# Patient Record
(demographics unavailable — no encounter records)

---

## 2024-12-07 NOTE — HISTORY OF PRESENT ILLNESS
[de-identified] : CC: hoarseness    HISTORY OF PRESENT ILLNESS: PILAR PRASAD is a 60 year male who presents today with hoarseness x 6 mo he has noticed that over the past 4-6 mo his voice has become more raspy/hoarse. 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 he also accidentally ingested lye and was exposed to a burning town for a few days he gargles with saltwater which mildly helps denies dysphagia, dyspnea, classic GERD symptoms, smoking  otherwise healthy     REVIEW OF SYSTEMS: General ROS: negative for - chills, fatigue or fever Psychological ROS: negative for - anxiety or depression Ophthalmic ROS: negative for - blurry vision, decreased vision or double vision ENT ROS: negative except as noted from HPI Allergy and Immunology ROS: negative except as noted from HPI Hematological and Lymphatic ROS: negative for - bleeding problems Endocrine ROS: negative for - malaise/lethargy Respiratory ROS: negative for - stridor Cardiovascular ROS: negative for - chest pain Gastrointestinal ROS: negative for - appetite loss or nausea/vomiting Genitourinary ROS: negative for - incontinence Musculoskeletal ROS: negative for - gait disturbance Neurological ROS: negative for - behavioral changes Dermatological ROS: negative for - nail changes   Physical Exam:   GENERAL APPEARANCE: Well-developed and No Acute Distress. COMMUNICATION: Able to Communicate. Strong Voice.   HEAD AND FACE Eyes: Testing of ocular motility including primary gaze alignment normal. Inspection and Appearance: No evidence of lesions or masses Palpation: Palpation of the face reveals no sinus tenderness Salivary Glands: Symmetric without masses Facial Strength: Symmetric without evidence of facial paralysis   EAR, NOSE, MOUTH, and THROAT: Ear Canals and Tympanic Membranes, Bilateral: No evidence of inflammation or lesions. Thresholds: Clinical speech reception thresholds normal. External, Nose and Auricle: No lesions or masses. Nasal, Mucosa, Septum, and Turbinates: See endoscopy.   NECK: Evaluation: No evidence of masses or crepitus. The neck is symmetric and the trachea is in the midline position. Thyroid: No evidence of enlargement, tenderness or mass. Neck Lymph Nodes: WNL. Respiratory: Inspection of the chest including symmetry, expansion and/or assessment of respiratory effort normal. Cardiovascular: Evaluation of peripheral vascular system by observation and palpation of capillary refill, normal. Neurological/Psychiatric: Alert, Oriented, Mood, and Affect Normal.   PROCEDURE: Flexible laryngoscopy with topical anesthesia (56112) ANESTHESIA: Topical with 4% Lidocaine   INDICATION FOR PROCEDURE: Unable to perform complete exam with mirror laryngoscopy   PREOPERATIVE DIAGNOSIS:    POSTOPERATIVE DIAGNOSIS: same as above   SURGEON: Klever Farley MD   Prior to the procedure, I had a discussion with the patient regarding the risks, benefits, and alternatives of the procedure and a verbal consent was obtained.   INSTRUMENTS: Flexible scope   OPERATIVE PROCEDURE NOTE:   Patient was taken to the endoscopy suite where the nose and the pharynx were anesthetized with topical Pontocaine in a spray form. After adequate anesthesia of the nasal cavity and the pharynx, the fiberoptic endoscope was inserted through the nasal cavity. The palate was identified for patency. The nasopharynx, oropharynx, hypopharynx and the larynx were examined, along with the base of tongue. Structural examination of vocal cord movement was carried out and the larynx was examined during phonation and deglutition. Gestures, such as cough, laughing and respiration were also performed to look for laryngeal abnormalities.   EXAM FINDINGS:    The nasal cavity mucosa was pink and normal. No nasal cavity lesions or masses were observed. No signs of infection or pus.   The nasopharynx was clear. No mass or lesion visualized.   The tongue was surface was found to be normal appearing. No lesions or masses seen in the pharyngeal, hypopharyngeal, or laryngeal airway. Mucosa appeared mildly erythematous over the arytenoids. no ulcers seen. thick mucus over the true cords. The vocal cords were mobile bilaterally. Airway intact.  IMPRESSION: PILAR PRASAD is a 60 year male who presents today dysphonia, LPR, accidentally inhalation of lye fumes   PLAN:  - cipro for 10 days - omeprazole 40mg, prescribed - low acid diet - RTC in 6 weeks       Klever Farley MD [facility] Rhinology and Skull Base Surgery Department of Otolaryngology- Head and Neck Surgery [facility]

## 2024-12-07 NOTE — HISTORY OF PRESENT ILLNESS
[de-identified] : CC: hoarseness    HISTORY OF PRESENT ILLNESS: PILAR PRASAD is a 60 year male who presents today with hoarseness x 6 mo he has noticed that over the past 4-6 mo his voice has become more raspy/hoarse. 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 he also accidentally ingested lye and was exposed to a [place] town for a few days he gargles with saltwater which mildly helps denies dysphagia, dyspnea, classic GERD symptoms, smoking  otherwise healthy     REVIEW OF SYSTEMS: General ROS: negative for - chills, fatigue or fever Psychological ROS: negative for - anxiety or depression Ophthalmic ROS: negative for - blurry vision, decreased vision or double vision ENT ROS: negative except as noted from HPI Allergy and Immunology ROS: negative except as noted from HPI Hematological and Lymphatic ROS: negative for - bleeding problems Endocrine ROS: negative for - malaise/lethargy Respiratory ROS: negative for - stridor Cardiovascular ROS: negative for - chest pain Gastrointestinal ROS: negative for - appetite loss or nausea/vomiting Genitourinary ROS: negative for - incontinence Musculoskeletal ROS: negative for - gait disturbance Neurological ROS: negative for - behavioral changes Dermatological ROS: negative for - nail changes   Physical Exam:   GENERAL APPEARANCE: Well-developed and No Acute Distress. COMMUNICATION: Able to Communicate. Strong Voice.   HEAD AND FACE Eyes: Testing of ocular motility including primary gaze alignment normal. Inspection and Appearance: No evidence of lesions or masses Palpation: Palpation of the face reveals no sinus tenderness Salivary Glands: Symmetric without masses Facial Strength: Symmetric without evidence of facial paralysis   EAR, NOSE, MOUTH, and THROAT: Ear Canals and Tympanic Membranes, Bilateral: No evidence of inflammation or lesions. Thresholds: Clinical speech reception thresholds normal. External, Nose and Auricle: No lesions or masses. Nasal, Mucosa, Septum, and Turbinates: See endoscopy.   NECK: Evaluation: No evidence of masses or crepitus. The neck is symmetric and the trachea is in the midline position. Thyroid: No evidence of enlargement, tenderness or mass. Neck Lymph Nodes: WNL. Respiratory: Inspection of the chest including symmetry, expansion and/or assessment of respiratory effort normal. Cardiovascular: Evaluation of peripheral vascular system by observation and palpation of capillary refill, normal. Neurological/Psychiatric: Alert, Oriented, Mood, and Affect Normal.   PROCEDURE: Flexible laryngoscopy with topical anesthesia (31794) ANESTHESIA: Topical with 4% Lidocaine   INDICATION FOR PROCEDURE: Unable to perform complete exam with mirror laryngoscopy   PREOPERATIVE DIAGNOSIS:    POSTOPERATIVE DIAGNOSIS: same as above   SURGEON: Klever Farley MD   Prior to the procedure, I had a discussion with the patient regarding the risks, benefits, and alternatives of the procedure and a verbal consent was obtained.   INSTRUMENTS: Flexible scope   OPERATIVE PROCEDURE NOTE:   Patient was taken to the endoscopy suite where the nose and the pharynx were anesthetized with topical Pontocaine in a spray form. After adequate anesthesia of the nasal cavity and the pharynx, the fiberoptic endoscope was inserted through the nasal cavity. The palate was identified for patency. The nasopharynx, oropharynx, hypopharynx and the larynx were examined, along with the base of tongue. Structural examination of vocal cord movement was carried out and the larynx was examined during phonation and deglutition. Gestures, such as cough, laughing and respiration were also performed to look for laryngeal abnormalities.   EXAM FINDINGS:    The nasal cavity mucosa was pink and normal. No nasal cavity lesions or masses were observed. No signs of infection or pus.   The nasopharynx was clear. No mass or lesion visualized.   The tongue was surface was found to be normal appearing. No lesions or masses seen in the pharyngeal, hypopharyngeal, or laryngeal airway. Mucosa appeared mildly erythematous over the arytenoids. no ulcers seen. thick mucus over the true cords. The vocal cords were mobile bilaterally. Airway intact.  IMPRESSION: PILAR PRASAD is a 60 year male who presents today dysphonia, LPR, accidentally inhalation of lye fumes   PLAN:  - cipro for 10 days - omeprazole 40mg, prescribed - low acid diet - RTC in 6 weeks       Klever Farley MD [facility] Rhinology and Skull Base Surgery Department of Otolaryngology- Head and Neck Surgery [facility]

## 2025-01-02 NOTE — ASSESSMENT
[FreeTextEntry1] : - 1/2/2025 60-year-old gentleman who presents with 4 months of dysphonia.  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  I reviewed this at length with the patient including the erythema and stippling nature.  There is also evidence of some leukoplakia on the right anterior vocal fold as well.  At this time I am recommending going to the operating room for direct microlaryngoscopy with biopsy and KTP laser epilation.  In the interim I am recommending voice hygiene and observation.  Patient have a preoperative visit and if symptoms are improving and physical exam findings are improving we will cancel the surgery.  Risk, benefits and alternatives of surgery were discussed including bleeding, infection, pain, risk of anesthesia, problems chewing or swallowing, changed or worsened voice, chipped teeth, tongue numbness, taste disturbance, referred ear pain, need for further procedures and possible time off of work.  Patient understands this and wishes to proceed.  If we do proceed with surgery and symptoms are persisting we will also plan for CT neck at that time.  -Plan for OR, suspension [employer] laryngoscopy with excision of vocal fold lesion and KTP laser epilation in 4 to 6 weeks. -For preoperative visit we will reassess and cancel surgery if improvement -If no improvement will also plan for CT neck

## 2025-01-02 NOTE — PROCEDURE
[de-identified] :  - Procedure Note   Pre-operative Diagnosis: Dysphonia Post-operative Diagnosis: Left vocal fold lesion leukoplakic with stippling and erythema, small amount of leukoplakia right anterior vocal fold as well Anesthesia: Topical - 1 % Lidocaine/Phenylephrine Procedure:  Flexible Laryngoscopy with Stroboscopy - [facility] [address]   Procedure Details:  The patient was placed in the sitting position.  After decongestant and anesthesia were applied the laryngoscope was passed.  The nasal cavities, nasopharynx, oropharynx, hypopharynx, and larynx were all examined.  Vocal folds were examined during respiration and phonation.  The following findings were noted:   Findings:  Nose: Septum is midline, turbinates are normal, nasal airways patent, mucosa normal Nasopharynx: Adenoids normal, no masses, eustachian tube normal Oropharynx: Pharyngeal walls symmetric and without lesion. Tonsils/fossae symmetric Hypopharynx: Hypopharynx and pyriform sinuses without lesion. No masses or asymmetry.  No pooling of secretions. Larynx:  Epiglottis and aryepiglottic folds were sharp and crisp bilaterally.  Bilateral false vocal folds normal appearance. Airway was widely patent.   Strobe Exam Ratings    TVF Appearance: Left vocal fold lesion leukoplakic with stippling and erythema, small amount of leukoplakia right anterior vocal fold as well TVF Mobility: normal Edema/hypertrophy: normal Mucus on TVF: normal Glottic Closure: + Gap Mucosal Wave: Reduced Amplitude of Vibration: Reduced Phase: symmetric Supraglottic Hyperfunction: none Other Findings: none   Condition: Stable.  Patient tolerated procedure well.   Complications: None   --------------------------------------------------------------------   Procedure: Pharyngeal and speech evaluation, by cine or video - CPT 19069  Voice assessment was recorded via video recording on this date.   Clarity: Hoarse and raspy Range: Reduced Pitch Control: Reduced Projection: normal Tremor: none Cough: none   Condition: Stable.  Patient tolerated procedure well. Complications: None

## 2025-01-02 NOTE — HISTORY OF PRESENT ILLNESS
[de-identified] : CC: hoarseness    HISTORY OF PRESENT ILLNESS: PILAR PRASAD is a 60 year male who presents today with hoarseness x 6 mo he has noticed that over the past 4-6 mo his voice has become more raspy/hoarse. 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 he also accidentally ingested lye and was exposed to a [place] for a [place] days he gargles with saltwater which mildly helps denies dysphagia, dyspnea, classic GERD symptoms, smoking  otherwise healthy   1 mo f/u s/p cipro, omeprazole voice is better but he still has significant dysphonia   REVIEW OF SYSTEMS: General ROS: negative for - chills, fatigue or fever Psychological ROS: negative for - anxiety or depression Ophthalmic ROS: negative for - blurry vision, decreased vision or double vision ENT ROS: negative except as noted from HPI Allergy and Immunology ROS: negative except as noted from HPI Hematological and Lymphatic ROS: negative for - bleeding problems Endocrine ROS: negative for - malaise/lethargy Respiratory ROS: negative for - stridor Cardiovascular ROS: negative for - chest pain Gastrointestinal ROS: negative for - appetite loss or nausea/vomiting Genitourinary ROS: negative for - incontinence Musculoskeletal ROS: negative for - gait disturbance Neurological ROS: negative for - behavioral changes Dermatological ROS: negative for - nail changes   Physical Exam:   GENERAL APPEARANCE: Well-developed and No Acute Distress. COMMUNICATION: Able to Communicate. Strong Voice.   HEAD AND FACE Eyes: Testing of ocular motility including primary gaze alignment normal. Inspection and Appearance: No evidence of lesions or masses Palpation: Palpation of the face reveals no sinus tenderness Salivary Glands: Symmetric without masses Facial Strength: Symmetric without evidence of facial paralysis   EAR, NOSE, MOUTH, and THROAT: Ear Canals and Tympanic Membranes, Bilateral: No evidence of inflammation or lesions. Thresholds: Clinical speech reception thresholds normal. External, Nose and Auricle: No lesions or masses. Nasal, Mucosa, Septum, and Turbinates: See endoscopy.   NECK: Evaluation: No evidence of masses or crepitus. The neck is symmetric and the trachea is in the midline position. Thyroid: No evidence of enlargement, tenderness or mass. Neck Lymph Nodes: WNL. Respiratory: Inspection of the chest including symmetry, expansion and/or assessment of respiratory effort normal. Cardiovascular: Evaluation of peripheral vascular system by observation and palpation of capillary refill, normal. Neurological/Psychiatric: Alert, Oriented, Mood, and Affect Normal.   PROCEDURE: Flexible laryngoscopy with topical anesthesia (46995) ANESTHESIA: Topical with 4% Lidocaine   INDICATION FOR PROCEDURE: Unable to perform complete exam with mirror laryngoscopy   PREOPERATIVE DIAGNOSIS:    POSTOPERATIVE DIAGNOSIS: same as above   SURGEON: Klever Farley MD   Prior to the procedure, I had a discussion with the patient regarding the risks, benefits, and alternatives of the procedure and a verbal consent was obtained.   INSTRUMENTS: Flexible scope   OPERATIVE PROCEDURE NOTE:   Patient was taken to the endoscopy suite where the nose and the pharynx were anesthetized with topical Pontocaine in a spray form. After adequate anesthesia of the nasal cavity and the pharynx, the fiberoptic endoscope was inserted through the nasal cavity. The palate was identified for patency. The nasopharynx, oropharynx, hypopharynx and the larynx were examined, along with the base of tongue. Structural examination of vocal cord movement was carried out and the larynx was examined during phonation and deglutition. Gestures, such as cough, laughing and respiration were also performed to look for laryngeal abnormalities.   EXAM FINDINGS:    The nasal cavity mucosa was pink and normal. No nasal cavity lesions or masses were observed. No signs of infection or pus.   The nasopharynx was clear. No mass or lesion visualized.   The tongue was surface was found to be normal appearing. No lesions or masses seen in the pharyngeal, hypopharyngeal, or laryngeal airway. left TVF mobile but there is erythema and a white plaque most consistent with leukoplakia. Airway intact.  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 Tillman, who will see patient for possible DL biopsy -patient is in agreement of management    Klever Farley MD [facility] Rhinology and Skull Base Surgery Department of Otolaryngology- Head and Neck Surgery [facility]

## 2025-01-02 NOTE — HISTORY OF PRESENT ILLNESS
[de-identified] : CC: hoarseness    HISTORY OF PRESENT ILLNESS: PIALR PRASAD is a 60 year male who presents today with hoarseness x 6 mo he has noticed that over the past 4-6 mo his voice has become more raspy/hoarse. 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 he also accidentally ingested lye and was exposed to a [place] town for a [place] days he gargles with saltwater which mildly helps denies dysphagia, dyspnea, classic GERD symptoms, smoking  otherwise healthy   1 mo f/u s/p cipro, omeprazole voice is better but he still has significant dysphonia   REVIEW OF SYSTEMS: General ROS: negative for - chills, fatigue or fever Psychological ROS: negative for - anxiety or depression Ophthalmic ROS: negative for - blurry vision, decreased vision or double vision ENT ROS: negative except as noted from HPI Allergy and Immunology ROS: negative except as noted from HPI Hematological and Lymphatic ROS: negative for - bleeding problems Endocrine ROS: negative for - malaise/lethargy Respiratory ROS: negative for - stridor Cardiovascular ROS: negative for - chest pain Gastrointestinal ROS: negative for - appetite loss or nausea/vomiting Genitourinary ROS: negative for - incontinence Musculoskeletal ROS: negative for - gait disturbance Neurological ROS: negative for - behavioral changes Dermatological ROS: negative for - nail changes   Physical Exam:   GENERAL APPEARANCE: Well-developed and No Acute Distress. COMMUNICATION: Able to Communicate. Strong Voice.   HEAD AND FACE Eyes: Testing of ocular motility including primary gaze alignment normal. Inspection and Appearance: No evidence of lesions or masses Palpation: Palpation of the face reveals no sinus tenderness Salivary Glands: Symmetric without masses Facial Strength: Symmetric without evidence of facial paralysis   EAR, NOSE, MOUTH, and THROAT: Ear Canals and Tympanic Membranes, Bilateral: No evidence of inflammation or lesions. Thresholds: Clinical speech reception thresholds normal. External, Nose and Auricle: No lesions or masses. Nasal, Mucosa, Septum, and Turbinates: See endoscopy.   NECK: Evaluation: No evidence of masses or crepitus. The neck is symmetric and the trachea is in the midline position. Thyroid: No evidence of enlargement, tenderness or mass. Neck Lymph Nodes: WNL. Respiratory: Inspection of the chest including symmetry, expansion and/or assessment of respiratory effort normal. Cardiovascular: Evaluation of peripheral vascular system by observation and palpation of capillary refill, normal. Neurological/Psychiatric: Alert, Oriented, Mood, and Affect Normal.   PROCEDURE: Flexible laryngoscopy with topical anesthesia (50842) ANESTHESIA: Topical with 4% Lidocaine   INDICATION FOR PROCEDURE: Unable to perform complete exam with mirror laryngoscopy   PREOPERATIVE DIAGNOSIS:    POSTOPERATIVE DIAGNOSIS: same as above   SURGEON: Klever Farley MD   Prior to the procedure, I had a discussion with the patient regarding the risks, benefits, and alternatives of the procedure and a verbal consent was obtained.   INSTRUMENTS: Flexible scope   OPERATIVE PROCEDURE NOTE:   Patient was taken to the endoscopy suite where the nose and the pharynx were anesthetized with topical Pontocaine in a spray form. After adequate anesthesia of the nasal cavity and the pharynx, the fiberoptic endoscope was inserted through the nasal cavity. The palate was identified for patency. The nasopharynx, oropharynx, hypopharynx and the larynx were examined, along with the base of tongue. Structural examination of vocal cord movement was carried out and the larynx was examined during phonation and deglutition. Gestures, such as cough, laughing and respiration were also performed to look for laryngeal abnormalities.   EXAM FINDINGS:    The nasal cavity mucosa was pink and normal. No nasal cavity lesions or masses were observed. No signs of infection or pus.   The nasopharynx was clear. No mass or lesion visualized.   The tongue was surface was found to be normal appearing. No lesions or masses seen in the pharyngeal, hypopharyngeal, or laryngeal airway. left TVF mobile but there is erythema and a white plaque most consistent with leukoplakia. Airway intact.  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 Tillman, who will see patient for possible DL biopsy -patient is in agreement of management    Klever Farley MD [facility] Rhinology and Skull Base Surgery Department of Otolaryngology- Head and Neck Surgery [facility]

## 2025-01-02 NOTE — REASON FOR VISIT
[Initial Consultation] : an initial consultation for [FreeTextEntry2] : Dysphonia, vocal fold lesion

## 2025-01-02 NOTE — HISTORY OF PRESENT ILLNESS
[de-identified] : - 1/2/2025 60-year-old gentleman who presents with concern for dysphonia and left vocal fold lesion.  Patient notes that symptoms began over 4 months ago.  At that time notes that he was exposed to "lye."  Since that time notes a hoarseness and rough quality to his voice.  This is fairly constant.  No issues chewing eating or swallowing.  No breathing issues.  After 3 months of symptoms seen by Dr. Farley and noted to have a left vocal fold leukoplakia.  Treated with voice hygiene, oral ciprofloxacin, and omeprazole.  After a month of treatment the patient noted some improvement in terms of voice.  That being said there remained persistent leukoplakia.  Patient referred to me for further evaluation.  Patient with moderate voice demands, works in [employer].

## 2025-01-31 NOTE — ASSESSMENT
[FreeTextEntry1] : - 1/2/2025 60-year-old gentleman who presents with 4 months of dysphonia.  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  I reviewed this at length with the patient including the erythema and stippling nature.  There is also evidence of some leukoplakia on the right anterior vocal fold as well.  At this time I am recommending going to the operating room for direct microlaryngoscopy with biopsy and KTP laser ablation.  In the interim I am recommending voice hygiene and observation.  Patient have a preoperative visit and if symptoms are improving and physical exam findings are improving we will cancel the surgery.  Risk, benefits and alternatives of surgery were discussed including bleeding, infection, pain, risk of anesthesia, problems chewing or swallowing, changed or worsened voice, chipped teeth, tongue numbness, taste disturbance, referred ear pain, need for further procedures and possible time off of work.  Patient understands this and wishes to proceed.  If we do proceed with surgery and symptoms are persisting we will also plan for CT neck at that time.  1/31/25: He has been treated with oral Ciprofloxacin, Fluconazole, and Omeprazole and notes his voice is 50% improved. On physical exam he was found to have persistent left vocal fold lesion leukoplakic with stippling and erythema, small amount of leukoplakia right anterior vocal fold. At this point I am recommending we proceed with going to the OR for suspension MicroDirect laryngoscopy with excision of vocal fold lesion and KTP laser ablation. Risks, benefits, and alternatives were again discussed. Postop antireflux medication was renewed and we reviewed postop voice rest instructions. Based on final pathology consider CT neck postoperatively.   -Plan for OR, suspension MicroDirect laryngoscopy with excision of vocal fold lesion and KTP laser ablation -Consider postop CT based on final pathology results

## 2025-01-31 NOTE — REASON FOR VISIT
[Subsequent Evaluation] : a subsequent evaluation for [FreeTextEntry2] : dysphonia, vocal fold lesion

## 2025-01-31 NOTE — PROCEDURE
[de-identified] :  - Procedure Note   Pre-operative Diagnosis: Dysphonia Post-operative Diagnosis: Left vocal fold lesion leukoplakic with stippling and erythema, small amount of leukoplakia right anterior vocal fold as well Anesthesia: Topical - 1 % Lidocaine/Phenylephrine Procedure:  Flexible Laryngoscopy with Stroboscopy - [facility] [id number]   Procedure Details:  The patient was placed in the sitting position.  After decongestant and anesthesia were applied the laryngoscope was passed.  The nasal cavities, nasopharynx, oropharynx, hypopharynx, and larynx were all examined.  Vocal folds were examined during respiration and phonation.  The following findings were noted:   Findings:  Nose: Septum is midline, turbinates are normal, nasal airways patent, mucosa normal Nasopharynx: Adenoids normal, no masses, eustachian tube normal Oropharynx: Pharyngeal walls symmetric and without lesion. Tonsils/fossae symmetric Hypopharynx: Hypopharynx and pyriform sinuses without lesion. No masses or asymmetry.  No pooling of secretions. Larynx:  Epiglottis and aryepiglottic folds were sharp and crisp bilaterally.  Bilateral false vocal folds normal appearance. Airway was widely patent.   Strobe Exam Ratings    TVF Appearance: Left vocal fold lesion leukoplakic with stippling and erythema, small amount of leukoplakia right anterior vocal fold as well TVF Mobility: normal Edema/hypertrophy: normal Mucus on TVF: normal Glottic Closure: + Gap Mucosal Wave: Reduced Amplitude of Vibration: Reduced Phase: symmetric Supraglottic Hyperfunction: none Other Findings: none   Condition: Stable.  Patient tolerated procedure well.   Complications: None   --------------------------------------------------------------------   Procedure: Pharyngeal and speech evaluation, by cine or video - CPT 32390  Voice assessment was recorded via video recording on this date.   Clarity: Hoarse and raspy Range: Reduced Pitch Control: Reduced Projection: normal Tremor: none Cough: none   Condition: Stable.  Patient tolerated procedure well. Complications: None

## 2025-01-31 NOTE — HISTORY OF PRESENT ILLNESS
[de-identified] : - 1/2/2025 60-year-old gentleman who presents with concern for dysphonia and left vocal fold lesion.  Patient notes that symptoms began over 4 months ago.  At that time notes that he was exposed to "lye."  Since that time notes a hoarseness and rough quality to his voice.  This is fairly constant.  No issues chewing eating or swallowing.  No breathing issues.  After 3 months of symptoms seen by Dr. Farley and noted to have a left vocal fold leukoplakia.  Treated with voice hygiene, oral ciprofloxacin, and omeprazole.  After a month of treatment the patient noted some improvement in terms of voice.  That being said there remained persistent leukoplakia.  Patient referred to me for further evaluation.  Patient with moderate voice demands, works in [employer].   - [FreeTextEntry1] : 1/31/25 Patient presents for repeat evaluation. He feels voice is 50% improved. In terms of treatments he has previously taken Ciprofloxacin, Fluconazole for 3 weeks, finished Omeprazole 5 days ago. He is tentatively scheduled for OR, suspension MicroDirect laryngoscopy with excision of vocal fold lesion and KTP laser ablation 2/5 pending this evaluation. He does not use inhalers. No history of diabetes. No new ENT issues otherwise.

## 2025-02-12 NOTE — DATA REVIEWED
[de-identified] : Accession:  [id number] Collected Date/Time: 2/5/2025 14:55 EST Received Date/Time:                    2/5/2025 17:44 EST  Surgical Pathology Report - Auth (Verified)  Specimen(s) Submitted 1. Left vocal fold lesion 2. Right vocal fold lesion  Final Diagnosis  1. 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 Right vocal fold lesion, biopsy: - Fragments of squamous epithelium with focal high-grade dysplasia - Multiple serial sections examined - Selected slides were reviewed intradepartmentally with another pathologist (MEGHANA) Verified by: Darell Rodriguez MD
DISPLAY PLAN FREE TEXT

## 2025-02-12 NOTE — HISTORY OF PRESENT ILLNESS
[de-identified] : - 1/2/2025 60-year-old gentleman who presents with concern for dysphonia and left vocal fold lesion.  Patient notes that symptoms began over 4 months ago.  At that time notes that he was exposed to "lye."  Since that time notes a hoarseness and rough quality to his voice.  This is fairly constant.  No issues chewing eating or swallowing.  No breathing issues.  After 3 months of symptoms seen by Dr. Farley and noted to have a left vocal fold leukoplakia.  Treated with voice hygiene, oral ciprofloxacin, and omeprazole.  After a month of treatment the patient noted some improvement in terms of voice.  That being said there remained persistent leukoplakia.  Patient referred to me for further evaluation.  Patient with moderate voice demands, works in [employer] and [employer].   - 1/31/25 Patient presents for repeat evaluation. He feels voice is 50% improved. In terms of treatments he has previously taken Ciprofloxacin, Fluconazole for 3 weeks, finished Omeprazole 5 days ago. He is tentatively scheduled for OR, suspension MicroDirect laryngoscopy with excision of vocal fold lesion and KTP laser ablation 2/5 pending this evaluation. He does not use inhalers. No history of diabetes. No new ENT issues otherwise. - [FreeTextEntry1] : 2/12/25 POD#7 s/p MDL excision of bilateral vocal fold lesions.  Voice is hoarse but improving. Doing well overall, no issues chewing, eating, or swallowing. No significant pain. No breathing issues.

## 2025-02-12 NOTE — ASSESSMENT
[FreeTextEntry1] : - 1/2/2025 60-year-old gentleman who presents with 4 months of dysphonia.  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  I reviewed this at length with the patient including the erythema and stippling nature.  There is also evidence of some leukoplakia on the right anterior vocal fold as well.  At this time I am recommending going to the operating room for direct microlaryngoscopy with biopsy and KTP laser ablation.  In the interim I am recommending voice hygiene and observation.  Patient have a preoperative visit and if symptoms are improving and physical exam findings are improving we will cancel the surgery.  Risk, benefits and alternatives of surgery were discussed including bleeding, infection, pain, risk of anesthesia, problems chewing or swallowing, changed or worsened voice, chipped teeth, tongue numbness, taste disturbance, referred ear pain, need for further procedures and possible time off of work.  Patient understands this and wishes to proceed.  If we do proceed with surgery and symptoms are persisting we will also plan for CT neck at that time.  1/31/25: He has been treated with oral Ciprofloxacin, Fluconazole, and Omeprazole and notes his voice is 50% improved. On physical exam he was found to have persistent left vocal fold lesion leukoplakic with stippling and erythema, small amount of leukoplakia right anterior vocal fold. At this point I am recommending we proceed with going to the OR for suspension MicroDirect laryngoscopy with excision of vocal fold lesion and KTP laser ablation. Risks, benefits, and alternatives were again discussed. Postop antireflux medication was renewed and we reviewed postop voice rest instructions. Based on final pathology consider CT neck postoperatively.   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  On exam patient overall doing well.  At this time I recommended continued voice hygiene and increased hydration.  I am recommending CT neck fine cuts through larynx as well as PET/CT.  Once we have these results we will plan for discussion at tumor board.  That being said we did have a lengthy discussion regarding next steps in treatment which could include observation versus radiation.  -Voice hygiene -CT neck, PET scan -Tumor board -Follow-up 1 month

## 2025-02-12 NOTE — PROCEDURE
[de-identified] :  - Procedure Note   Pre-operative Diagnosis: Dysphonia Post-operative Diagnosis: postoperative changes with well-healing vocal folds bilaterally Anesthesia: Topical - 1 % Lidocaine/Phenylephrine Procedure:  Flexible Laryngoscopy with Stroboscopy - [facility] [id number]   Procedure Details:  The patient was placed in the sitting position.  After decongestant and anesthesia were applied the laryngoscope was passed.  The nasal cavities, nasopharynx, oropharynx, hypopharynx, and larynx were all examined.  Vocal folds were examined during respiration and phonation.  The following findings were noted:   Findings:  Nose: Septum is midline, turbinates are normal, nasal airways patent, mucosa normal Nasopharynx: Adenoids normal, no masses, eustachian tube normal Oropharynx: Pharyngeal walls symmetric and without lesion. Tonsils/fossae symmetric Hypopharynx: Hypopharynx and pyriform sinuses without lesion. No masses or asymmetry.  No pooling of secretions. Larynx:  Epiglottis and aryepiglottic folds were sharp and crisp bilaterally.  Bilateral false vocal folds normal appearance. Airway was widely patent.   Strobe Exam Ratings    TVF Appearance:postoperative changes with well-healing vocal folds bilaterally TVF Mobility: normal Edema/hypertrophy: normal Mucus on TVF: normal Glottic Closure: + Gap Mucosal Wave: Reduced Amplitude of Vibration: Reduced Phase: symmetric Supraglottic Hyperfunction: none Other Findings: none   Condition: Stable.  Patient tolerated procedure well. Complications: None   --------------------------------------------------------------------   Procedure: Pharyngeal and speech evaluation, by cine or video - CPT 31649  Voice assessment was recorded via video recording on this date.   Clarity: Hoarse and raspy Range: Reduced Pitch Control: Reduced Projection: normal Tremor: none Cough: none   Condition: Stable.  Patient tolerated procedure well. Complications: None

## 2025-03-06 NOTE — END OF VISIT
[Time Spent: ___ minutes] : I have spent [unfilled] minutes of time on the encounter which excludes teaching and separately reported services.
prenatal chart

## 2025-03-06 NOTE — ASSESSMENT
[FreeTextEntry1] : - 1/2/2025 60-year-old gentleman who presents with 4 months of dysphonia.  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  I reviewed this at length with the patient including the erythema and stippling nature.  There is also evidence of some leukoplakia on the right anterior vocal fold as well.  At this time I am recommending going to the operating room for direct microlaryngoscopy with biopsy and KTP laser ablation.  In the interim I am recommending voice hygiene and observation.  Patient have a preoperative visit and if symptoms are improving and physical exam findings are improving we will cancel the surgery.  Risk, benefits and alternatives of surgery were discussed including bleeding, infection, pain, risk of anesthesia, problems chewing or swallowing, changed or worsened voice, chipped teeth, tongue numbness, taste disturbance, referred ear pain, need for further procedures and possible time off of work.  Patient understands this and wishes to proceed.  If we do proceed with surgery and symptoms are persisting we will also plan for CT neck at that time.  1/31/25: He has been treated with oral Ciprofloxacin, Fluconazole, and Omeprazole and notes his voice is 50% improved. On physical exam he was found to have persistent left vocal fold lesion leukoplakic with stippling and erythema, small amount of leukoplakia right anterior vocal fold. At this point I am recommending we proceed with going to the OR for suspension MicroDirect laryngoscopy with excision of vocal fold lesion and KTP laser ablation. Risks, benefits, and alternatives were again discussed. Postop antireflux medication was renewed and we reviewed postop voice rest instructions. Based on final pathology consider CT neck postoperatively.   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  On exam patient overall doing well.  At this time I recommended continued voice hygiene and increased hydration.  I am recommending CT neck fine cuts through larynx as well as PET/CT.  Once we have these results we will plan for discussion at tumor board.  That being said we did have a lengthy discussion regarding next steps in treatment which could include observation versus radiation.  3/6/2025 Overall stable.  Notes improvement in terms of voice.  Completed a CT and PET scan.  Both these were essentially unremarkable as to any malignancy or distant disease.  We also had our tumor board meeting with recommendations including observation versus XRT.  I reviewed the risk benefits and alternatives with the patient at length.  Patient will consider these options though for now leaning towards observation.  Will plan for next postop visit in 2 weeks.  -Reviewed tumor board recommendations will discuss again at next visit -Voice hygiene -Follow-up 2 wks

## 2025-03-06 NOTE — HISTORY OF PRESENT ILLNESS
[de-identified] : - 1/2/2025 60-year-old gentleman who presents with concern for dysphonia and left vocal fold lesion.  Patient notes that symptoms began over 4 months ago.  At that time notes that he was exposed to "lye."  Since that time notes a hoarseness and rough quality to his voice.  This is fairly constant.  No issues chewing eating or swallowing.  No breathing issues.  After 3 months of symptoms seen by Dr. Farley and noted to have a left vocal fold leukoplakia.  Treated with voice hygiene, oral ciprofloxacin, and omeprazole.  After a month of treatment the patient noted some improvement in terms of voice.  That being said there remained persistent leukoplakia.  Patient referred to me for further evaluation.  Patient with moderate voice demands, works in [email or web address] and [employer].   - 1/31/25 Patient presents for repeat evaluation. He feels voice is 50% improved. In terms of treatments he has previously taken Ciprofloxacin, Fluconazole for 3 weeks, finished Omeprazole 5 days ago. He is tentatively scheduled for OR, suspension MicroDirect laryngoscopy with excision of vocal fold lesion and KTP laser ablation 2/5 pending this evaluation. He does not use inhalers. No history of diabetes. No new ENT issues otherwise. - [FreeTextEntry1] : 2/12/25 POD#7 s/p MDL excision of bilateral vocal fold lesions.  Voice is hoarse but improving. Doing well overall, no issues chewing, eating, or swallowing. No significant pain. No breathing issues.  - 3/6/2025 Overall notes improvement in terms of voice.  Did complete CT and PET scan.  Presents to review those results.  Also we completed our tumor board with plan to review those recommendations as well. [TextEntry] : Visit initiated at patient request.  This visit is completed using audio/visual technology.  There are limitations of telemedicine encounters including risks associated with the technology platform and limited physical exam.  There is also limitation in performing diagnostic procedures and patients may need further testing and work up to arrive at a diagnosis and treatment plan. The patient below is located at home at that time of the visit.  I, Jono Tillman MD, the provider am located at my medical office.  Verbal consent for telehealth services for the date attached to this note was given by the patient.  This will be billed as a visit.  The patient understands this and elected to proceed.

## 2025-03-17 NOTE — HISTORY OF PRESENT ILLNESS
[de-identified] : - 1/2/2025 60-year-old gentleman who presents with concern for dysphonia and left vocal fold lesion.  Patient notes that symptoms began over 4 months ago.  At that time notes that he was exposed to "lye."  Since that time notes a hoarseness and rough quality to his voice.  This is fairly constant.  No issues chewing eating or swallowing.  No breathing issues.  After 3 months of symptoms seen by Dr. Farley and noted to have a left vocal fold leukoplakia.  Treated with voice hygiene, oral ciprofloxacin, and omeprazole.  After a month of treatment the patient noted some improvement in terms of voice.  That being said there remained persistent leukoplakia.  Patient referred to me for further evaluation.  Patient with moderate voice demands, works in [employer] and [employer].   - 1/31/25 Patient presents for repeat evaluation. He feels voice is 50% improved. In terms of treatments he has previously taken Ciprofloxacin, Fluconazole for 3 weeks, finished Omeprazole 5 days ago. He is tentatively scheduled for OR, suspension MicroDirect laryngoscopy with excision of vocal fold lesion and KTP laser ablation 2/5 pending this evaluation. He does not use inhalers. No history of diabetes. No new ENT issues otherwise. - 2/12/25 POD#7 s/p MDL excision of bilateral vocal fold lesions.  Voice is hoarse but improving. Doing well overall, no issues chewing, eating, or swallowing. No significant pain. No breathing issues.  - 3/6/2025 Overall notes improvement in terms of voice.  Did complete CT and PET scan.  Presents to review those results.  Also we completed our tumor board with plan to review those recommendations as well. - [FreeTextEntry1] : 3/17/25 6 weeks s/p MDL excision of bilateral vocal fold lesions. Stable in terms of his voice. No issues chewing, eating, or swallowing. Overall doing well.  [TextEntry] : Visit initiated at patient request.  This visit is completed using audio/visual technology.  There are limitations of telemedicine encounters including risks associated with the technology platform and limited physical exam.  There is also limitation in performing diagnostic procedures and patients may need further testing and work up to arrive at a diagnosis and treatment plan. The patient below is located at home at that time of the visit.  I, Jono Tillman MD, the provider am located at my medical office.  Verbal consent for telehealth services for the date attached to this note was given by the patient.  This will be billed as a visit.  The patient understands this and elected to proceed.

## 2025-03-17 NOTE — PROCEDURE
[de-identified] :  - Procedure Note   Pre-operative Diagnosis: Dysphonia Post-operative Diagnosis: postoperative changes with well-healing vocal folds bilaterally Anesthesia: Topical - 1 % Lidocaine/Phenylephrine Procedure:  Flexible Laryngoscopy with Stroboscopy - [facility] [id number]   Procedure Details:  The patient was placed in the sitting position.  After decongestant and anesthesia were applied the laryngoscope was passed.  The nasal cavities, nasopharynx, oropharynx, hypopharynx, and larynx were all examined.  Vocal folds were examined during respiration and phonation.  The following findings were noted:   Findings:  Nose: Septum is midline, turbinates are normal, nasal airways patent, mucosa normal Nasopharynx: Adenoids normal, no masses, eustachian tube normal Oropharynx: Pharyngeal walls symmetric and without lesion. Tonsils/fossae symmetric Hypopharynx: Hypopharynx and pyriform sinuses without lesion. No masses or asymmetry.  No pooling of secretions. Larynx:  Epiglottis and aryepiglottic folds were sharp and crisp bilaterally.  Bilateral false vocal folds normal appearance. Airway was widely patent.   Strobe Exam Ratings    TVF Appearance:postoperative changes with well-healing vocal folds bilaterally TVF Mobility: normal Edema/hypertrophy: normal Mucus on TVF: normal Glottic Closure: + Gap Mucosal Wave: Reduced Amplitude of Vibration: Reduced Phase: symmetric Supraglottic Hyperfunction: none Other Findings: none   Condition: Stable.  Patient tolerated procedure well. Complications: None   --------------------------------------------------------------------   Procedure: Pharyngeal and speech evaluation, by cine or video - CPT 22792  Voice assessment was recorded via video recording on this date.   Clarity: Hoarse and raspy Range: Reduced Pitch Control: Reduced Projection: normal Tremor: none Cough: none   Condition: Stable.  Patient tolerated procedure well. Complications: None

## 2025-03-17 NOTE — ASSESSMENT
[FreeTextEntry1] : - 1/2/2025 60-year-old gentleman who presents with 4 months of dysphonia.  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  I reviewed this at length with the patient including the erythema and stippling nature.  There is also evidence of some leukoplakia on the right anterior vocal fold as well.  At this time I am recommending going to the operating room for direct microlaryngoscopy with biopsy and KTP laser ablation.  In the interim I am recommending voice hygiene and observation.  Patient have a preoperative visit and if symptoms are improving and physical exam findings are improving we will cancel the surgery.  Risk, benefits and alternatives of surgery were discussed including bleeding, infection, pain, risk of anesthesia, problems chewing or swallowing, changed or worsened voice, chipped teeth, tongue numbness, taste disturbance, referred ear pain, need for further procedures and possible time off of work.  Patient understands this and wishes to proceed.  If we do proceed with surgery and symptoms are persisting we will also plan for CT neck at that time.  1/31/25: He has been treated with oral Ciprofloxacin, Fluconazole, and Omeprazole and notes his voice is 50% improved. On physical exam he was found to have persistent left vocal fold lesion leukoplakic with stippling and erythema, small amount of leukoplakia right anterior vocal fold. At this point I am recommending we proceed with going to the OR for suspension MicroDirect laryngoscopy with excision of vocal fold lesion and KTP laser ablation. Risks, benefits, and alternatives were again discussed. Postop antireflux medication was renewed and we reviewed postop voice rest instructions. Based on final pathology consider CT neck postoperatively.   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  On exam patient overall doing well.  At this time I recommended continued voice hygiene and increased hydration.  I am recommending CT neck fine cuts through larynx as well as PET/CT.  Once we have these results we will plan for discussion at tumor board.  That being said we did have a lengthy discussion regarding next steps in treatment which could include observation versus radiation.  3/6/2025 Overall stable.  Notes improvement in terms of voice.  Completed a CT and PET scan.  Both these were essentially unremarkable as to any malignancy or distant disease.  We also had our tumor board meeting with recommendations including observation versus XRT.  I reviewed the risk benefits and alternatives with the patient at length.  Patient will consider these options though for now leaning towards observation.  Will plan for next postop visit in 2 weeks.  3/17/2025 Overall stable and well.  Notes continued improvement in terms of voice.  Exam shows continued healing of the vocal folds.  We again reviewed our tumor board recommendations, XRT versus observation.  Patient would like to proceed with observation.  At this time I am recommending continued voice hygiene, increase hydration and follow-up in 2 months for surveillance.  He knows to follow-up sooner should symptoms worsen or fail to improve.  Will also consider SLP for some mild dysphonia.  -SLP consultation -Voice hygiene -Follow-up 2 months for surveillance

## 2025-05-19 NOTE — HISTORY OF PRESENT ILLNESS
[de-identified] : - 1/2/2025 60-year-old gentleman who presents with concern for dysphonia and left vocal fold lesion.  Patient notes that symptoms began over 4 months ago.  At that time notes that he was exposed to "lye."  Since that time notes a hoarseness and rough quality to his voice.  This is fairly constant.  No issues chewing eating or swallowing.  No breathing issues.  After 3 months of symptoms seen by Dr. Farley and noted to have a left vocal fold leukoplakia.  Treated with voice hygiene, oral ciprofloxacin, and omeprazole.  After a month of treatment the patient noted some improvement in terms of voice.  That being said there remained persistent leukoplakia.  Patient referred to me for further evaluation.  Patient with moderate voice demands, works in [employer] and [employer].   - 1/31/25 Patient presents for repeat evaluation. He feels voice is 50% improved. In terms of treatments he has previously taken Ciprofloxacin, Fluconazole for 3 weeks, finished Omeprazole 5 days ago. He is tentatively scheduled for OR, suspension MicroDirect laryngoscopy with excision of vocal fold lesion and KTP laser ablation 2/5 pending this evaluation. He does not use inhalers. No history of diabetes. No new ENT issues otherwise. - 2/12/25 POD#7 s/p MDL excision of bilateral vocal fold lesions.  Voice is hoarse but improving. Doing well overall, no issues chewing, eating, or swallowing. No significant pain. No breathing issues.  - 3/6/2025 Overall notes improvement in terms of voice.  Did complete CT and PET scan.  Presents to review those results.  Also we completed our tumor board with plan to review those recommendations as well. - 3/17/25 6 weeks s/p MDL excision of bilateral vocal fold lesions. Stable in terms of his voice. No issues chewing, eating, or swallowing. Overall doing well.  - [FreeTextEntry1] : 5/19/25 3mo  s/p MDL excision of bilateral vocal fold lesions. Stable in terms of his voice. No issues chewing, eating, or swallowing. Overall doing well.  Notes that voice continues to improve though there is hoarseness at times.  No issues chewing eating or swallowing.  No breathing issues.  Did have a headache that has slowly resolved over the past few weeks.  No new ENT issues otherwise. [TextEntry] : Visit initiated at patient request.  This visit is completed using audio/visual technology.  There are limitations of telemedicine encounters including risks associated with the technology platform and limited physical exam.  There is also limitation in performing diagnostic procedures and patients may need further testing and work up to arrive at a diagnosis and treatment plan. The patient below is located at home at that time of the visit.  I, Jono Tillman MD, the provider am located at my medical office.  Verbal consent for telehealth services for the date attached to this note was given by the patient.  This will be billed as a visit.  The patient understands this and elected to proceed. General

## 2025-05-19 NOTE — PROCEDURE
[de-identified] :  - Procedure Note   Pre-operative Diagnosis: Dysphonia Post-operative Diagnosis: postoperative changes with well-healed vocal folds bilaterally Anesthesia: Topical - 1 % Lidocaine/Phenylephrine Procedure:  Flexible Laryngoscopy with Stroboscopy - CPT 74473   Procedure Details:  The patient was placed in the sitting position.  After decongestant and anesthesia were applied the laryngoscope was passed.  The nasal cavities, nasopharynx, oropharynx, hypopharynx, and larynx were all examined.  Vocal folds were examined during respiration and phonation.  The following findings were noted:   Findings:  Nose: Septum is midline, turbinates are normal, nasal airways patent, mucosa normal Nasopharynx: Adenoids normal, no masses, eustachian tube normal Oropharynx: Pharyngeal walls symmetric and without lesion. Tonsils/fossae symmetric Hypopharynx: Hypopharynx and pyriform sinuses without lesion. No masses or asymmetry.  No pooling of secretions. Larynx:  Epiglottis and aryepiglottic folds were sharp and crisp bilaterally.  Bilateral false vocal folds normal appearance. Airway was widely patent.   Strobe Exam Ratings    TVF Appearance:postoperative changes with well-healed vocal folds bilaterally TVF Mobility: normal Edema/hypertrophy: normal Mucus on TVF: normal Glottic Closure: None Mucosal Wave: Mildly reduced Amplitude of Vibration: Mildly reduced Phase: symmetric Supraglottic Hyperfunction: none Other Findings: none   Condition: Stable.  Patient tolerated procedure well. Complications: None   --------------------------------

## 2025-05-19 NOTE — ASSESSMENT
[FreeTextEntry1] : - 1/2/2025 60-year-old gentleman who presents with 4 months of dysphonia.  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  I reviewed this at length with the patient including the erythema and stippling nature.  There is also evidence of some leukoplakia on the right anterior vocal fold as well.  At this time I am recommending going to the operating room for direct microlaryngoscopy with biopsy and KTP laser ablation.  In the interim I am recommending voice hygiene and observation.  Patient have a preoperative visit and if symptoms are improving and physical exam findings are improving we will cancel the surgery.  Risk, benefits and alternatives of surgery were discussed including bleeding, infection, pain, risk of anesthesia, problems chewing or swallowing, changed or worsened voice, chipped teeth, tongue numbness, taste disturbance, referred ear pain, need for further procedures and possible time off of work.  Patient understands this and wishes to proceed.  If we do proceed with surgery and symptoms are persisting we will also plan for CT neck at that time.  1/31/25: He has been treated with oral Ciprofloxacin, Fluconazole, and Omeprazole and notes his voice is 50% improved. On physical exam he was found to have persistent left vocal fold lesion leukoplakic with stippling and erythema, small amount of leukoplakia right anterior vocal fold. At this point I am recommending we proceed with going to the OR for suspension MicroDirect laryngoscopy with excision of vocal fold lesion and KTP laser ablation. Risks, benefits, and alternatives were again discussed. Postop antireflux medication was renewed and we reviewed postop voice rest instructions. Based on final pathology consider CT neck postoperatively.   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  On exam patient overall doing well.  At this time I recommended continued voice hygiene and increased hydration.  I am recommending CT neck fine cuts through larynx as well as PET/CT.  Once we have these results we will plan for discussion at tumor board.  That being said we did have a lengthy discussion regarding next steps in treatment which could include observation versus radiation.  3/6/2025 Overall stable.  Notes improvement in terms of voice.  Completed a CT and PET scan.  Both these were essentially unremarkable as to any malignancy or distant disease.  We also had our tumor board meeting with recommendations including observation versus XRT.  I reviewed the risk benefits and alternatives with the patient at length.  Patient will consider these options though for now leaning towards observation.  Will plan for next postop visit in 2 weeks.  3/17/2025 Overall stable and well.  Notes continued improvement in terms of voice.  Exam shows continued healing of the vocal folds.  We again reviewed our tumor board recommendations, XRT versus observation.  Patient would like to proceed with observation.  At this time I am recommending continued voice hygiene, increase hydration and follow-up in 2 months for surveillance.  He knows to follow-up sooner should symptoms worsen or fail to improve.  Will also consider SLP for some mild dysphonia.  5/19/2025 3-month status post MDL excision of vocal lesion, pathology consistent with squamous of carcinoma.  Patient has continued to do remarkably well.  Voice has been improving.  Vocal folds have been healing and are essentially back to normal at this visit with normal waveform.  At this time I am recommending speech therapy and follow-up in 2 months for surveillance.  -SLP consultation -Voice hygiene -Follow-up 2 months for surveillance

## 2025-07-02 NOTE — PROCEDURE
[de-identified] :  - Procedure Note   Pre-operative Diagnosis: Dysphonia Post-operative Diagnosis: postoperative changes with well-healed vocal folds bilaterally, noted small anterior laryngocele bilaterally Anesthesia: Topical - 1 % Lidocaine/Phenylephrine Procedure:  Flexible Laryngoscopy with Stroboscopy - CPT 20178   Procedure Details:  The patient was placed in the sitting position.  After decongestant and anesthesia were applied the laryngoscope was passed.  The nasal cavities, nasopharynx, oropharynx, hypopharynx, and larynx were all examined.  Vocal folds were examined during respiration and phonation.  The following findings were noted:   Findings:  Nose: Septum is midline, turbinates are normal, nasal airways patent, mucosa normal Nasopharynx: Adenoids normal, no masses, eustachian tube normal Oropharynx: Pharyngeal walls symmetric and without lesion. Tonsils/fossae symmetric Hypopharynx: Hypopharynx and pyriform sinuses without lesion. No masses or asymmetry.  No pooling of secretions. Larynx:  Epiglottis and aryepiglottic folds were sharp and crisp bilaterally.  Bilateral false vocal folds normal appearance. Airway was widely patent.  Noted small anterior fullness consistent with laryngocele's bilaterally   Strobe Exam Ratings    TVF Appearance:postoperative changes with well-healed vocal folds bilaterally TVF Mobility: normal Edema/hypertrophy: normal Mucus on TVF: normal Glottic Closure: None Mucosal Wave: Mildly reduced Amplitude of Vibration: Mildly reduced Phase: symmetric Supraglottic Hyperfunction: none Other Findings: none   Condition: Stable.  Patient tolerated procedure well. Complications: None   --------------------------------

## 2025-07-02 NOTE — HISTORY OF PRESENT ILLNESS
[de-identified] : - 1/2/2025 60-year-old gentleman who presents with concern for dysphonia and left vocal fold lesion.  Patient notes that symptoms began over 4 months ago.  At that time notes that he was exposed to "lye."  Since that time notes a hoarseness and rough quality to his voice.  This is fairly constant.  No issues chewing eating or swallowing.  No breathing issues.  After 3 months of symptoms seen by Dr. Farley and noted to have a left vocal fold leukoplakia.  Treated with voice hygiene, oral ciprofloxacin, and omeprazole.  After a month of treatment the patient noted some improvement in terms of voice.  That being said there remained persistent leukoplakia.  Patient referred to me for further evaluation.  Patient with moderate voice demands, works in [employer] and [employer].   - 1/31/25 Patient presents for repeat evaluation. He feels voice is 50% improved. In terms of treatments he has previously taken Ciprofloxacin, Fluconazole for 3 weeks, finished Omeprazole 5 days ago. He is tentatively scheduled for OR, suspension MicroDirect laryngoscopy with excision of vocal fold lesion and KTP laser ablation 2/5 pending this evaluation. He does not use inhalers. No history of diabetes. No new ENT issues otherwise. - 2/12/25 POD#7 s/p MDL excision of bilateral vocal fold lesions.  Voice is hoarse but improving. Doing well overall, no issues chewing, eating, or swallowing. No significant pain. No breathing issues.  - 3/6/2025 Overall notes improvement in terms of voice.  Did complete CT and PET scan.  Presents to review those results.  Also we completed our tumor board with plan to review those recommendations as well. - 3/17/25 6 weeks s/p MDL excision of bilateral vocal fold lesions. Stable in terms of his voice. No issues chewing, eating, or swallowing. Overall doing well.  - 5/19/25 3mo  s/p MDL excision of bilateral vocal fold lesions. Stable in terms of his voice. No issues chewing, eating, or swallowing. Overall doing well.  Notes that voice continues to improve though there is hoarseness at times.  No issues chewing eating or swallowing.  No breathing issues.  Did have a headache that has slowly resolved over the past few weeks.  No new ENT issues otherwise. - [FreeTextEntry1] : 7/2/25 5 months s/p MDL excision of lesions pathology consistent with squamous of carcinoma. Here for surveillance. Working with speech team for voice therapy.  Notes continued voice improvement.  No issues chewing eating swallowing.  No fevers night sweats weight loss.    [TextEntry] : Visit initiated at patient request.  This visit is completed using audio/visual technology.  There are limitations of telemedicine encounters including risks associated with the technology platform and limited physical exam.  There is also limitation in performing diagnostic procedures and patients may need further testing and work up to arrive at a diagnosis and treatment plan. The patient below is located at home at that time of the visit.  I, Jono Tillman MD, the provider am located at my medical office.  Verbal consent for telehealth services for the date attached to this note was given by the patient.  This will be billed as a visit.  The patient understands this and elected to proceed.

## 2025-07-02 NOTE — PROCEDURE
[de-identified] :  - Procedure Note   Pre-operative Diagnosis: Dysphonia Post-operative Diagnosis: postoperative changes with well-healed vocal folds bilaterally, noted small anterior laryngocele bilaterally Anesthesia: Topical - 1 % Lidocaine/Phenylephrine Procedure:  Flexible Laryngoscopy with Stroboscopy - CPT 69921   Procedure Details:  The patient was placed in the sitting position.  After decongestant and anesthesia were applied the laryngoscope was passed.  The nasal cavities, nasopharynx, oropharynx, hypopharynx, and larynx were all examined.  Vocal folds were examined during respiration and phonation.  The following findings were noted:   Findings:  Nose: Septum is midline, turbinates are normal, nasal airways patent, mucosa normal Nasopharynx: Adenoids normal, no masses, eustachian tube normal Oropharynx: Pharyngeal walls symmetric and without lesion. Tonsils/fossae symmetric Hypopharynx: Hypopharynx and pyriform sinuses without lesion. No masses or asymmetry.  No pooling of secretions. Larynx:  Epiglottis and aryepiglottic folds were sharp and crisp bilaterally.  Bilateral false vocal folds normal appearance. Airway was widely patent.  Noted small anterior fullness consistent with laryngocele's bilaterally   Strobe Exam Ratings    TVF Appearance:postoperative changes with well-healed vocal folds bilaterally TVF Mobility: normal Edema/hypertrophy: normal Mucus on TVF: normal Glottic Closure: None Mucosal Wave: Mildly reduced Amplitude of Vibration: Mildly reduced Phase: symmetric Supraglottic Hyperfunction: none Other Findings: none   Condition: Stable.  Patient tolerated procedure well. Complications: None   --------------------------------

## 2025-07-02 NOTE — HISTORY OF PRESENT ILLNESS
[de-identified] : - 1/2/2025 60-year-old gentleman who presents with concern for dysphonia and left vocal fold lesion.  Patient notes that symptoms began over 4 months ago.  At that time notes that he was exposed to "lye."  Since that time notes a hoarseness and rough quality to his voice.  This is fairly constant.  No issues chewing eating or swallowing.  No breathing issues.  After 3 months of symptoms seen by Dr. Farley and noted to have a left vocal fold leukoplakia.  Treated with voice hygiene, oral ciprofloxacin, and omeprazole.  After a month of treatment the patient noted some improvement in terms of voice.  That being said there remained persistent leukoplakia.  Patient referred to me for further evaluation.  Patient with moderate voice demands, works in [employer] and [employer].   - 1/31/25 Patient presents for repeat evaluation. He feels voice is 50% improved. In terms of treatments he has previously taken Ciprofloxacin, Fluconazole for 3 weeks, finished Omeprazole 5 days ago. He is tentatively scheduled for OR, suspension MicroDirect laryngoscopy with excision of vocal fold lesion and KTP laser ablation 2/5 pending this evaluation. He does not use inhalers. No history of diabetes. No new ENT issues otherwise. - 2/12/25 POD#7 s/p MDL excision of bilateral vocal fold lesions.  Voice is hoarse but improving. Doing well overall, no issues chewing, eating, or swallowing. No significant pain. No breathing issues.  - 3/6/2025 Overall notes improvement in terms of voice.  Did complete CT and PET scan.  Presents to review those results.  Also we completed our tumor board with plan to review those recommendations as well. - 3/17/25 6 weeks s/p MDL excision of bilateral vocal fold lesions. Stable in terms of his voice. No issues chewing, eating, or swallowing. Overall doing well.  - 5/19/25 3mo  s/p MDL excision of bilateral vocal fold lesions. Stable in terms of his voice. No issues chewing, eating, or swallowing. Overall doing well.  Notes that voice continues to improve though there is hoarseness at times.  No issues chewing eating or swallowing.  No breathing issues.  Did have a headache that has slowly resolved over the past few weeks.  No new ENT issues otherwise. - [FreeTextEntry1] : 7/2/25 5 months s/p MDL excision of lesions pathology consistent with squamous of carcinoma. Here for surveillance. Working with speech team for voice therapy.  Notes continued voice improvement.  No issues chewing eating swallowing.  No fevers night sweats weight loss.    [TextEntry] : Visit initiated at patient request.  This visit is completed using audio/visual technology.  There are limitations of telemedicine encounters including risks associated with the technology platform and limited physical exam.  There is also limitation in performing diagnostic procedures and patients may need further testing and work up to arrive at a diagnosis and treatment plan. The patient below is located at home at that time of the visit.  I, Jono Tillman MD, the provider am located at my medical office.  Verbal consent for telehealth services for the date attached to this note was given by the patient.  This will be billed as a visit.  The patient understands this and elected to proceed.

## 2025-07-02 NOTE — ASSESSMENT
[FreeTextEntry1] : - 1/2/2025 60-year-old gentleman who presents with 4 months of dysphonia.  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  I reviewed this at length with the patient including the erythema and stippling nature.  There is also evidence of some leukoplakia on the right anterior vocal fold as well.  At this time I am recommending going to the operating room for direct microlaryngoscopy with biopsy and KTP laser ablation.  In the interim I am recommending voice hygiene and observation.  Patient have a preoperative visit and if symptoms are improving and physical exam findings are improving we will cancel the surgery.  Risk, benefits and alternatives of surgery were discussed including bleeding, infection, pain, risk of anesthesia, problems chewing or swallowing, changed or worsened voice, chipped teeth, tongue numbness, taste disturbance, referred ear pain, need for further procedures and possible time off of work.  Patient understands this and wishes to proceed.  If we do proceed with surgery and symptoms are persisting we will also plan for CT neck at that time.  1/31/25: He has been treated with oral Ciprofloxacin, Fluconazole, and Omeprazole and notes his voice is 50% improved. On physical exam he was found to have persistent left vocal fold lesion leukoplakic with stippling and erythema, small amount of leukoplakia right anterior vocal fold. At this point I am recommending we proceed with going to the OR for suspension [employer] laryngoscopy with excision of vocal fold lesion and KTP laser ablation. Risks, benefits, and alternatives were again discussed. Postop antireflux medication was renewed and we reviewed postop voice rest instructions. Based on final pathology consider CT neck postoperatively.   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  On exam patient overall doing well.  At this time I recommended continued voice hygiene and increased hydration.  I am recommending CT neck fine cuts through larynx as well as PET/CT.  Once we have these results we will plan for discussion at tumor board.  That being said we did have a lengthy discussion regarding next steps in treatment which could include observation versus radiation.  3/6/2025 Overall stable.  Notes improvement in terms of voice.  Completed a CT and PET scan.  Both these were essentially unremarkable as to any malignancy or distant disease.  We also had our tumor board meeting with recommendations including observation versus XRT.  I reviewed the risk benefits and alternatives with the patient at length.  Patient will consider these options though for now leaning towards observation.  Will plan for next postop visit in 2 weeks.  3/17/2025 Overall stable and well.  Notes continued improvement in terms of voice.  Exam shows continued healing of the vocal folds.  We again reviewed our tumor board recommendations, XRT versus observation.  Patient would like to proceed with observation.  At this time I am recommending continued voice hygiene, increase hydration and follow-up in 2 months for surveillance.  He knows to follow-up sooner should symptoms worsen or fail to improve.  Will also consider SLP for some mild dysphonia.  5/19/2025 3-month status post MDL excision of vocal lesion, pathology consistent with squamous of carcinoma.  Patient has continued to do remarkably well.  Voice has been improving.  Vocal folds have been healing and are essentially back to normal at this visit with normal waveform.  At this time I am recommending speech therapy and follow-up in 2 months for surveillance.  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  Patient has continued to do remarkably well.  Voice has been improving.  Vocal folds have been healing and are essentially back to normal at this visit with normal waveform.  At this time I am recommending speech therapy and follow-up in 2 months for surveillance.  -Continue with SLP for voice therapy -Voice hygiene -Follow-up 2 months for surveillance